# Patient Record
Sex: FEMALE | Race: WHITE | NOT HISPANIC OR LATINO | Employment: OTHER | URBAN - NONMETROPOLITAN AREA
[De-identification: names, ages, dates, MRNs, and addresses within clinical notes are randomized per-mention and may not be internally consistent; named-entity substitution may affect disease eponyms.]

---

## 2022-10-04 ENCOUNTER — DOCUMENTATION (OUTPATIENT)
Dept: FAMILY MEDICINE CLINIC | Facility: CLINIC | Age: 87
End: 2022-10-04

## 2022-10-04 PROBLEM — Z91.89 AT RISK FOR CENTRAL SLEEP APNEA: Status: ACTIVE | Noted: 2022-10-04

## 2022-10-04 PROBLEM — R53.1 WEAK: Status: ACTIVE | Noted: 2022-10-04

## 2022-10-04 PROBLEM — I48.91 UNSPECIFIED ATRIAL FIBRILLATION: Status: ACTIVE | Noted: 2022-10-04

## 2022-10-04 PROBLEM — R19.7 DIARRHEA: Status: ACTIVE | Noted: 2022-10-04

## 2022-10-04 PROBLEM — I10 HYPERTENSION: Status: ACTIVE | Noted: 2022-10-04

## 2022-10-04 RX ORDER — CEFUROXIME AXETIL 500 MG/1
500 TABLET ORAL 2 TIMES DAILY
COMMUNITY

## 2022-10-04 RX ORDER — MULTIPLE VITAMINS W/ MINERALS TAB 9MG-400MCG
1 TAB ORAL DAILY
COMMUNITY

## 2022-10-04 RX ORDER — ACETAMINOPHEN 325 MG/1
650 TABLET ORAL EVERY 6 HOURS PRN
COMMUNITY

## 2022-10-04 RX ORDER — AMIODARONE HYDROCHLORIDE 200 MG/1
200 TABLET ORAL DAILY
COMMUNITY

## 2022-10-04 RX ORDER — DOCUSATE SODIUM 100 MG/1
100 CAPSULE, LIQUID FILLED ORAL 2 TIMES DAILY
COMMUNITY

## 2022-10-06 ENCOUNTER — NURSING HOME (OUTPATIENT)
Dept: INTERNAL MEDICINE | Facility: CLINIC | Age: 87
End: 2022-10-06

## 2022-10-06 VITALS
OXYGEN SATURATION: 97 % | HEART RATE: 76 BPM | DIASTOLIC BLOOD PRESSURE: 78 MMHG | RESPIRATION RATE: 18 BRPM | WEIGHT: 118 LBS | TEMPERATURE: 97.9 F | SYSTOLIC BLOOD PRESSURE: 128 MMHG

## 2022-10-06 DIAGNOSIS — G30.1 MODERATE LATE ONSET ALZHEIMER'S DEMENTIA WITHOUT BEHAVIORAL DISTURBANCE, PSYCHOTIC DISTURBANCE, MOOD DISTURBANCE, OR ANXIETY: ICD-10-CM

## 2022-10-06 DIAGNOSIS — F02.B0 MODERATE LATE ONSET ALZHEIMER'S DEMENTIA WITHOUT BEHAVIORAL DISTURBANCE, PSYCHOTIC DISTURBANCE, MOOD DISTURBANCE, OR ANXIETY: ICD-10-CM

## 2022-10-06 DIAGNOSIS — N12 PYELONEPHRITIS: Primary | ICD-10-CM

## 2022-10-06 DIAGNOSIS — I48.0 PAROXYSMAL ATRIAL FIBRILLATION: ICD-10-CM

## 2022-10-06 DIAGNOSIS — I10 PRIMARY HYPERTENSION: ICD-10-CM

## 2022-10-06 PROCEDURE — 99305 1ST NF CARE MODERATE MDM 35: CPT | Performed by: INTERNAL MEDICINE

## 2022-10-09 NOTE — PROGRESS NOTES
Nursing Home History and Physical       Dineshdmitry Benjamin DO []  BORA Vargas []  [address] [place] [address]  Phone: [phone]  Fax: [phone] Chapo Lutz MD []  Jose Dejesus DO [x]   [address] [place] [address]  Phone: [phone]  Fax: [phone]     PATIENT NAME: Mirna Kennedy                                                                          YOB: 1928           DATE OF SERVICE: 10/06/2022  FACILITY:  []  [facility]   [] [place]  [] [facility]   [] [facility] [place]   []  [facility]   [x]  [facility]  []  [place]    CHIEF COMPLAINT:  Nursing facility admission       HISTORY OF PRESENT ILLNESS:   Patient is a 89-year-old white female with no significant past medical history was recently hospitalized at [facility] for pyelonephritis.  During her hospitalization, she was noted to have new onset atrial fibrillation.  Patient was evaluated with cardiology and was started on Eliquis, metoprolol, and amiodarone.      On exam today, patient was resting comfortably and seemed to be content with her care.  She did mention that she was having some blisters on her legs due to edema but this was gradually improving.  She was very confused and thought that I had met her before.  She seems to have good support from her [employer] based on her conversation.    PAST MEDICAL & SURGICAL HISTORY:   Past Medical History:   Diagnosis Date   • Atrial fibrillation (HCC)    • History of shingles    • Hypertension    • Hyponatremia    • Severe sepsis (HCC) with ecoli       Past Surgical History:   Procedure Laterality Date   • CATARACT EXTRACTION, BILATERAL           MEDICATIONS:  I have reviewed and reconciled the patients medication list in the patients chart at the skilled nursing facility on 10/06/2022.      ALLERGIES:  Allergies   Allergen Reactions   • Erythromycin Unknown - High Severity     Edema in lower extremities         SOCIAL HISTORY:  Social  History     Socioeconomic History   • Marital status:    Tobacco Use   • Smoking status: Never   Substance and Sexual Activity   • Alcohol use: Never   • Drug use: Never   • Sexual activity: Defer       FAMILY HISTORY:  Family History   Problem Relation Age of Onset   • Other Mother         natural causes   • Heart disease Father         REVIEW OF SYSTEMS:  Review of Systems   Constitutional: Negative for chills, fatigue and fever.   HENT: Negative for congestion, ear pain, rhinorrhea, sinus pressure and sore throat.    Eyes: Negative for visual disturbance.   Respiratory: Negative for cough, chest tightness, shortness of breath and wheezing.    Cardiovascular: Negative for chest pain, palpitations and leg swelling.   Gastrointestinal: Negative for abdominal pain, blood in stool, constipation, diarrhea, nausea and vomiting.   Endocrine: Negative for polydipsia and polyuria.   Genitourinary: Negative for dysuria and hematuria.   Musculoskeletal: Negative for arthralgias and back pain.   Skin: Negative for rash.   Neurological: Negative for dizziness, light-headedness, numbness and headaches.   Psychiatric/Behavioral: Negative for dysphoric mood and sleep disturbance. The patient is not nervous/anxious.          PHYSICAL EXAMINATION:   VITAL SIGNS: /78   Pulse 76   Temp 97.9 °F (36.6 °C)   Resp 18   Wt 53.5 kg (118 lb)   SpO2 97%     Physical Exam  Vitals and nursing note reviewed.   Constitutional:       General: She is not in acute distress.     Appearance: Normal appearance. She is well-developed. She is not ill-appearing.      Comments: Frail elderly female in no acute distress   HENT:      Head: Normocephalic and atraumatic.      Nose: Nose normal.      Mouth/Throat:      Mouth: Mucous membranes are moist.      Pharynx: No oropharyngeal exudate.   Eyes:      General: No scleral icterus.     Conjunctiva/sclera: Conjunctivae normal.      Pupils: Pupils are equal, round, and reactive to light.    Neck:      Thyroid: No thyromegaly.   Cardiovascular:      Rate and Rhythm: Normal rate and regular rhythm.      Heart sounds: Normal heart sounds. No murmur heard.    No friction rub. No gallop.   Pulmonary:      Effort: Pulmonary effort is normal. No respiratory distress.      Breath sounds: Normal breath sounds. No wheezing.   Abdominal:      General: Bowel sounds are normal. There is no distension.      Palpations: Abdomen is soft.      Tenderness: There is no abdominal tenderness.   Musculoskeletal:         General: No deformity or signs of injury.      Cervical back: Normal range of motion and neck supple.   Lymphadenopathy:      Cervical: No cervical adenopathy.   Skin:     General: Skin is warm and dry.      Findings: No rash.   Neurological:      Mental Status: She is alert and oriented to person, place, and time.   Psychiatric:         Mood and Affect: Mood normal.         Behavior: Behavior normal.      Comments: Confused         RECORDS REVIEW:   Discharge Summary from [facility] 10/3/2022    ASSESSMENT   Diagnoses and all orders for this visit:    1. Pyelonephritis (Primary)    2. Paroxysmal atrial fibrillation (HCC)    3. Primary hypertension    4. Moderate late onset Alzheimer's dementia without behavioral disturbance, psychotic disturbance, mood disturbance, or anxiety ([facility])        PLAN  Pyelonephritis  - Patient appears to have recovered fairly well after treatment with Ceftin.  Continue physical therapy for strengthening following her hospitalization.    Paroxysmal atrial fibrillation/hypertension  - Continue metoprolol, amiodarone, and Eliquis.  - Follow-up with cardiology as scheduled.    Constipation  - Continue Colace    Dementia  - Not mentioned in any prior documentation available to me, however on exam today, it is apparent that the patient's cognitive function and memory are impaired. I would suggest considering Aricept or namenda after discussion with PCP.          [x]   Discussed Patient in detail with nursing/staff, addressed all needs today.     [x]  Plan of Care Reviewed   [x]  PT/OT Reviewed   []  Order Changes  []  Discharge Plans Reviewed  [x]  Advance Directive on file with Nursing Home.   [x]  POA on file with Nursing Home.    [x]  Code Status listed and reviewed.       Jose Dejesus DO.  10/8/2022      **Part of this note may be an electronic transcription/translation of spoken language to printed text using the Dragon Dictation System.**